# Patient Record
Sex: MALE | Race: WHITE
[De-identification: names, ages, dates, MRNs, and addresses within clinical notes are randomized per-mention and may not be internally consistent; named-entity substitution may affect disease eponyms.]

---

## 2018-06-10 ENCOUNTER — HOSPITAL ENCOUNTER (EMERGENCY)
Dept: HOSPITAL 4 - SED | Age: 18
LOS: 1 days | Discharge: PSYCHIATRIC OR SUBSTANCE USE FACILITY | End: 2018-06-11
Payer: MEDICAID

## 2018-06-10 VITALS — HEIGHT: 72 IN | WEIGHT: 155 LBS | BODY MASS INDEX: 20.99 KG/M2

## 2018-06-10 VITALS — SYSTOLIC BLOOD PRESSURE: 121 MMHG

## 2018-06-10 DIAGNOSIS — F15.10: Primary | ICD-10-CM

## 2018-06-10 DIAGNOSIS — R03.0: ICD-10-CM

## 2018-06-10 DIAGNOSIS — F11.10: ICD-10-CM

## 2018-06-10 DIAGNOSIS — F41.9: ICD-10-CM

## 2018-06-10 LAB
ALBUMIN SERPL BCP-MCNC: 4.2 G/DL (ref 3.4–4.8)
ALT SERPL W P-5'-P-CCNC: 30 U/L (ref 12–78)
AMPHETAMINES UR QL SCN: POSITIVE
ANION GAP SERPL CALCULATED.3IONS-SCNC: 11 MMOL/L (ref 5–15)
APAP SERPL-MCNC: < 1 UG/ML (ref 1–30)
AST SERPL W P-5'-P-CCNC: 65 U/L (ref 10–37)
BARBITURATES UR QL SCN: NEGATIVE
BASOPHILS # BLD AUTO: 0.1 K/UL (ref 0–0.2)
BASOPHILS NFR BLD AUTO: 0.5 % (ref 0–2)
BENZODIAZ UR QL SCN: NEGATIVE
BILIRUB SERPL-MCNC: 1.4 MG/DL (ref 0–1)
BUN SERPL-MCNC: 20 MG/DL (ref 8–21)
BZE UR QL SCN: POSITIVE
CALCIUM SERPL-MCNC: 9.3 MG/DL (ref 8.4–11)
CANNABINOIDS UR QL SCN: POSITIVE
CHLORIDE SERPL-SCNC: 89 MMOL/L (ref 98–107)
CREAT SERPL-MCNC: 1.05 MG/DL (ref 0.55–1.3)
EOSINOPHIL # BLD AUTO: 0 K/UL (ref 0–0.4)
EOSINOPHIL NFR BLD AUTO: 0.3 % (ref 0–4)
ERYTHROCYTE [DISTWIDTH] IN BLOOD BY AUTOMATED COUNT: 12.1 % (ref 9–15)
ETHANOL SERPL-MCNC: < 3 MG/DL (ref ?–10)
GFR SERPL CREATININE-BSD FRML MDRD: 116 ML/MIN (ref 90–?)
GLUCOSE SERPL-MCNC: 73 MG/DL (ref 70–99)
HCT VFR BLD AUTO: 41.7 % (ref 36–54)
HGB BLD-MCNC: 14 G/DL (ref 14–18)
LYMPHOCYTES # BLD AUTO: 1.3 K/UL (ref 1–5.5)
LYMPHOCYTES NFR BLD AUTO: 11.3 % (ref 20.5–51.5)
MCH RBC QN AUTO: 31 PG (ref 27–31)
MCHC RBC AUTO-ENTMCNC: 33 % (ref 32–36)
MCV RBC AUTO: 92 FL (ref 79–98)
METHADONE UR-SCNC: NEGATIVE UMOL/L
METHAMPHET UR-SCNC: POSITIVE UMOL/L
MONOCYTES # BLD MANUAL: 1 K/UL (ref 0–1)
MONOCYTES # BLD MANUAL: 9 % (ref 1.7–9.3)
NEUTROPHILS # BLD AUTO: 9.1 K/UL (ref 1.8–7.7)
NEUTROPHILS NFR BLD AUTO: 78.9 % (ref 40–70)
OPIATES UR QL SCN: POSITIVE
OXYCODONE SERPL-MCNC: NEGATIVE NG/ML
PCP UR QL SCN: NEGATIVE
PLATELET # BLD AUTO: 246 K/UL (ref 130–430)
POTASSIUM SERPL-SCNC: 3.8 MMOL/L (ref 3.5–5.1)
RBC # BLD AUTO: 4.52 MIL/UL (ref 4.2–6.2)
SODIUM SERPLBLD-SCNC: 127 MMOL/L (ref 136–145)
TRICYCLICS UR-MCNC: NEGATIVE NG/ML
URINE PROPOXYPHENE SCREEN: NEGATIVE
WBC # BLD AUTO: 11.5 K/UL (ref 4.5–11)

## 2018-06-10 PROCEDURE — 99285 EMERGENCY DEPT VISIT HI MDM: CPT

## 2018-06-10 PROCEDURE — 93005 ELECTROCARDIOGRAM TRACING: CPT

## 2018-06-10 PROCEDURE — G0480 DRUG TEST DEF 1-7 CLASSES: HCPCS

## 2018-06-10 PROCEDURE — G0482 DRUG TEST DEF 15-21 CLASSES: HCPCS

## 2018-06-10 PROCEDURE — 80307 DRUG TEST PRSMV CHEM ANLYZR: CPT

## 2018-06-10 PROCEDURE — G0481 DRUG TEST DEF 8-14 CLASSES: HCPCS

## 2018-06-10 PROCEDURE — 85025 COMPLETE CBC W/AUTO DIFF WBC: CPT

## 2018-06-10 PROCEDURE — 36415 COLL VENOUS BLD VENIPUNCTURE: CPT

## 2018-06-10 PROCEDURE — 80053 COMPREHEN METABOLIC PANEL: CPT

## 2018-06-10 NOTE — NUR
Patient to ER via EMT ambulance, patient sent from [facility] for 
medical clearance. Patient reports using Meth and Heroin today at 0630, and 
1430. Patient reports thathe was at [facility] to get assistance with 
Detox. Patient denies pain/sob at present, patient able to ambulate from 
ambulance gurney to ER bed 6 without difficulty. Patient is awake, alert and 
oriented in no acute distress, vital signs stable-with elevated heart rate 
noted, respirations even and unlabored, skin warm and dry to touch. Awaiting 
evaluation by ER MD, will continue to observe and assess.

## 2018-06-10 NOTE — NUR
Received call from [place] intake RN. Patient needs medical evaluation and 
medical clearance before returning for treatment.

## 2018-06-10 NOTE — NUR
Patient resting quietly in no acute distress, vital signs stable, respirations 
even and unlabored, skin warm and dry to touch.

## 2018-06-10 NOTE — NUR
Placed in room 06  . Placed on cardiac monitor, blood pressure machine and 
pulse oximeter. To gown for exam. Side rails up. Report given to PACO Lewis.

## 2018-06-11 VITALS — SYSTOLIC BLOOD PRESSURE: 110 MMHG

## 2018-06-11 NOTE — NUR
[facility] [place] called back and states that they will accept the patient. 
Charge PACO [facility] spoke with [facility].

## 2018-06-11 NOTE — NUR
Patient given written and verbal discharge instructions and verbalizes 
understanding.  ER MD discussed with patient the results and treatment 
provided. Patient in stable condition. ID arm band removed.

No RX given. Patient educated on pain management and to follow up with PMD. 
Pain Scale 0.

Opportunity for questions provided and answered. 



Patient was sent to [facility] [place] via [employer] ambulance in stable 
condition. No adverse reaction noted to medication.

## 2018-06-11 NOTE — NUR
Assessment remains unchanged, awaiting transfer back to [facility] [place]. ETA 
of transport team [id number].

## 2022-06-22 NOTE — NUR
Transport team [facility], Jolene Delcambre states that they are not 
expecting patient back and need to speak with their supervisor. ,